# Patient Record
Sex: FEMALE | Race: WHITE | Employment: UNEMPLOYED | URBAN - METROPOLITAN AREA
[De-identification: names, ages, dates, MRNs, and addresses within clinical notes are randomized per-mention and may not be internally consistent; named-entity substitution may affect disease eponyms.]

---

## 2017-03-09 ENCOUNTER — HOSPITAL ENCOUNTER (EMERGENCY)
Age: 32
Discharge: ANOTHER HOSPITAL | End: 2017-03-10
Attending: OBSTETRICS & GYNECOLOGY | Admitting: OBSTETRICS & GYNECOLOGY
Payer: OTHER GOVERNMENT

## 2017-03-10 VITALS
OXYGEN SATURATION: 99 % | HEART RATE: 91 BPM | RESPIRATION RATE: 18 BRPM | SYSTOLIC BLOOD PRESSURE: 119 MMHG | DIASTOLIC BLOOD PRESSURE: 64 MMHG | TEMPERATURE: 97.9 F

## 2017-03-10 PROBLEM — O42.919 PRETERM PREMATURE RUPTURE OF MEMBRANES (PPROM) WITH UNKNOWN ONSET OF LABOR: Status: ACTIVE | Noted: 2017-03-10

## 2017-03-10 PROBLEM — O30.102 TRIPLET GESTATION IN SECOND TRIMESTER: Status: ACTIVE | Noted: 2017-03-10

## 2017-03-10 PROCEDURE — 74011000258 HC RX REV CODE- 258: Performed by: OBSTETRICS & GYNECOLOGY

## 2017-03-10 PROCEDURE — 99283 EMERGENCY DEPT VISIT LOW MDM: CPT

## 2017-03-10 PROCEDURE — 96360 HYDRATION IV INFUSION INIT: CPT

## 2017-03-10 PROCEDURE — 74011250636 HC RX REV CODE- 250/636: Performed by: OBSTETRICS & GYNECOLOGY

## 2017-03-10 PROCEDURE — 96372 THER/PROPH/DIAG INJ SC/IM: CPT

## 2017-03-10 PROCEDURE — 96375 TX/PRO/DX INJ NEW DRUG ADDON: CPT

## 2017-03-10 PROCEDURE — 96361 HYDRATE IV INFUSION ADD-ON: CPT

## 2017-03-10 PROCEDURE — 96367 TX/PROPH/DG ADDL SEQ IV INF: CPT

## 2017-03-10 PROCEDURE — 74011250636 HC RX REV CODE- 250/636: Performed by: ADVANCED PRACTICE MIDWIFE

## 2017-03-10 PROCEDURE — 96368 THER/DIAG CONCURRENT INF: CPT

## 2017-03-10 PROCEDURE — 96365 THER/PROPH/DIAG IV INF INIT: CPT

## 2017-03-10 PROCEDURE — 76815 OB US LIMITED FETUS(S): CPT

## 2017-03-10 RX ORDER — MAGNESIUM SULFATE HEPTAHYDRATE 40 MG/ML
2 INJECTION, SOLUTION INTRAVENOUS ONCE
Status: COMPLETED | OUTPATIENT
Start: 2017-03-10 | End: 2017-03-10

## 2017-03-10 RX ORDER — MAGNESIUM SULFATE HEPTAHYDRATE 40 MG/ML
2 INJECTION, SOLUTION INTRAVENOUS
Status: DISCONTINUED | OUTPATIENT
Start: 2017-03-10 | End: 2017-03-10

## 2017-03-10 RX ORDER — BUSPIRONE HYDROCHLORIDE 30 MG/1
30 TABLET ORAL DAILY
COMMUNITY

## 2017-03-10 RX ORDER — GUAIFENESIN 100 MG/5ML
81 LIQUID (ML) ORAL DAILY
COMMUNITY

## 2017-03-10 RX ORDER — CALC/MAG/B COMPLEX/D3/HERB 61
TABLET ORAL
COMMUNITY

## 2017-03-10 RX ORDER — SERTRALINE HYDROCHLORIDE 100 MG/1
200 TABLET, FILM COATED ORAL DAILY
COMMUNITY

## 2017-03-10 RX ORDER — BETAMETHASONE SODIUM PHOSPHATE AND BETAMETHASONE ACETATE 3; 3 MG/ML; MG/ML
12 INJECTION, SUSPENSION INTRA-ARTICULAR; INTRALESIONAL; INTRAMUSCULAR; SOFT TISSUE ONCE
Status: COMPLETED | OUTPATIENT
Start: 2017-03-10 | End: 2017-03-10

## 2017-03-10 RX ORDER — AMPICILLIN 2 G/1
2 INJECTION, POWDER, FOR SOLUTION INTRAVENOUS EVERY 6 HOURS
Status: DISCONTINUED | OUTPATIENT
Start: 2017-03-10 | End: 2017-03-10 | Stop reason: HOSPADM

## 2017-03-10 RX ADMIN — BETAMETHASONE SODIUM PHOSPHATE AND BETAMETHASONE ACETATE 12 MG: 3; 3 INJECTION, SUSPENSION INTRA-ARTICULAR; INTRALESIONAL; INTRAMUSCULAR at 03:06

## 2017-03-10 RX ADMIN — MAGNESIUM SULFATE HEPTAHYDRATE 2 G: 40 INJECTION, SOLUTION INTRAVENOUS at 03:10

## 2017-03-10 RX ADMIN — Medication 2 G/HR: at 03:22

## 2017-03-10 RX ADMIN — MAGNESIUM SULFATE HEPTAHYDRATE 2 G: 40 INJECTION, SOLUTION INTRAVENOUS at 03:00

## 2017-03-10 RX ADMIN — AMPICILLIN SODIUM 2 G: 2 INJECTION, POWDER, FOR SOLUTION INTRAMUSCULAR; INTRAVENOUS at 02:45

## 2017-03-10 RX ADMIN — SODIUM CHLORIDE 250 MG: 900 INJECTION, SOLUTION INTRAVENOUS at 03:23

## 2017-03-10 NOTE — PROGRESS NOTES
S: Paras Heads of fluid at 2315     HPI: 30yo [id number] at 24.5 GA,  [facility] 6/24/17 with triamniotic gestation. Pair A and B monochorionic. 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 She has a history of tubal ligation reversal. This pregnancy was achieved with fertility agents. O: Sterile Spec: gross pooling of blood tinged fluid in vaginal vault, cervix visually closed        Nitrizine positive        Ferning positive        Abdomen: gravid, nontender        Bedside limited ultrasound: confirmed FCA of Fetus C and B. No FCA Fetus A and decreased fluid around Fetus A        [place]: Irregular contractions and irritability mild to palpation     A: 24.5 GA triamniotic pregnancy with demise fetus A, Grossly ruptured      P: Dr. Mal Bowers notified of findings and will come to unit to transfer patient to [facility]      Patient seen. Agree w/ above. Discussed findings w/ patient and . Agreeable for transfer to [facility]. Case d/w Dr. Tiffanie Mcnamara - the accepting M. Patient started on [facility] protocol, ANCS and mag.

## 2017-03-10 NOTE — PROGRESS NOTES
[id number] VERITO Aguilar paged via Marley Wade. 200 Dr. Rome Oneill paged via [phone] [facility] [address]. Rome Oneill called, MD informed that patient arrived at 24.6 weeks, previous triplet pregnancy, one demised approximately 2 weeks ago and has not been delivered, +FM for only one fetus per patient currently, unable to get FHT at this time, confirmed gross rupture, nitrazine +, patient anthony, receives care at [facility] but no records have been received yet, MD bedside evaluation and bedside US requested by RN due to inability to locate [facility]. MD denies need to come in at this time and states that patient is stable, given above information. MD would like   VERITO Aguilar to be informed of patient for evaluation at this time.

## 2017-03-10 NOTE — ROUTINE PROCESS
0005-This nurse called VERITO Manley and asked for evaluation. CNM enroute. Unable to obtain fetal heart tones via ultrasound and used doppler. HT of 130-140's. Pt not feeling any pressure or pain. 0033-CNM at bedside ultasound placed and two heart tones identified, one with no heart tones. No further orders. 0108-Speculum with pooling, +nitrazine and ferning. 0140-Dr Guerrero here for transfer. 0221-[facility] called and ETA 30 mins. 0223-Report called to [address] [place] [address] RN. MD order merser protcol, mag and steroids. 0345-[facility] taken pt by stretcher. All emtala forms signed.

## 2022-03-18 PROBLEM — O42.919 PRETERM PREMATURE RUPTURE OF MEMBRANES (PPROM) WITH UNKNOWN ONSET OF LABOR: Status: ACTIVE | Noted: 2017-03-10

## 2022-03-18 PROBLEM — O30.102 TRIPLET GESTATION IN SECOND TRIMESTER: Status: ACTIVE | Noted: 2017-03-10

## 2024-07-19 ENCOUNTER — HOSPITAL ENCOUNTER (OUTPATIENT)
Facility: HOSPITAL | Age: 39
Discharge: HOME | End: 2024-07-22